# Patient Record
Sex: MALE | Race: WHITE | NOT HISPANIC OR LATINO | URBAN - METROPOLITAN AREA
[De-identification: names, ages, dates, MRNs, and addresses within clinical notes are randomized per-mention and may not be internally consistent; named-entity substitution may affect disease eponyms.]

---

## 2018-03-09 ENCOUNTER — OFFICE VISIT (OUTPATIENT)
Dept: FAMILY MEDICINE CLINIC | Facility: CLINIC | Age: 28
End: 2018-03-09

## 2018-03-09 VITALS
HEART RATE: 70 BPM | BODY MASS INDEX: 19.85 KG/M2 | RESPIRATION RATE: 16 BRPM | DIASTOLIC BLOOD PRESSURE: 64 MMHG | SYSTOLIC BLOOD PRESSURE: 112 MMHG | WEIGHT: 134 LBS | OXYGEN SATURATION: 99 % | TEMPERATURE: 98.3 F | HEIGHT: 69 IN

## 2018-03-09 DIAGNOSIS — Z00.00 ANNUAL PHYSICAL EXAM: Primary | ICD-10-CM

## 2018-03-09 LAB
ALBUMIN SERPL-MCNC: 4.7 G/DL (ref 3.2–4.8)
ALBUMIN/GLOB SERPL: 1.6 G/DL (ref 1.5–2.5)
ALP SERPL-CCNC: 57 U/L (ref 25–100)
ALT SERPL W P-5'-P-CCNC: 29 U/L (ref 7–40)
ANION GAP SERPL CALCULATED.3IONS-SCNC: 8 MMOL/L (ref 3–11)
ARTICHOKE IGE QN: 174 MG/DL (ref 0–130)
AST SERPL-CCNC: 31 U/L (ref 0–33)
BASOPHILS # BLD AUTO: 0.08 10*3/MM3 (ref 0–0.2)
BASOPHILS NFR BLD AUTO: 1.1 % (ref 0–1)
BILIRUB SERPL-MCNC: 0.8 MG/DL (ref 0.3–1.2)
BUN BLD-MCNC: 12 MG/DL (ref 9–23)
BUN/CREAT SERPL: 12 (ref 7–25)
CALCIUM SPEC-SCNC: 9.7 MG/DL (ref 8.7–10.4)
CHLORIDE SERPL-SCNC: 101 MMOL/L (ref 99–109)
CHOLEST SERPL-MCNC: 229 MG/DL (ref 0–200)
CO2 SERPL-SCNC: 30 MMOL/L (ref 20–31)
CREAT BLD-MCNC: 1 MG/DL (ref 0.6–1.3)
DEPRECATED RDW RBC AUTO: 40.3 FL (ref 37–54)
EOSINOPHIL # BLD AUTO: 0.36 10*3/MM3 (ref 0–0.3)
EOSINOPHIL NFR BLD AUTO: 5 % (ref 0–3)
ERYTHROCYTE [DISTWIDTH] IN BLOOD BY AUTOMATED COUNT: 12.6 % (ref 11.3–14.5)
GFR SERPL CREATININE-BSD FRML MDRD: 91 ML/MIN/1.73
GLOBULIN UR ELPH-MCNC: 2.9 GM/DL
GLUCOSE BLD-MCNC: 94 MG/DL (ref 70–100)
HCT VFR BLD AUTO: 47.9 % (ref 38.9–50.9)
HDLC SERPL-MCNC: 53 MG/DL (ref 40–60)
HGB BLD-MCNC: 16 G/DL (ref 13.1–17.5)
IMM GRANULOCYTES # BLD: 0.03 10*3/MM3 (ref 0–0.03)
IMM GRANULOCYTES NFR BLD: 0.4 % (ref 0–0.6)
LYMPHOCYTES # BLD AUTO: 2.68 10*3/MM3 (ref 0.6–4.8)
LYMPHOCYTES NFR BLD AUTO: 37 % (ref 24–44)
MCH RBC QN AUTO: 29.5 PG (ref 27–31)
MCHC RBC AUTO-ENTMCNC: 33.4 G/DL (ref 32–36)
MCV RBC AUTO: 88.2 FL (ref 80–99)
MONOCYTES # BLD AUTO: 0.51 10*3/MM3 (ref 0–1)
MONOCYTES NFR BLD AUTO: 7 % (ref 0–12)
NEUTROPHILS # BLD AUTO: 3.58 10*3/MM3 (ref 1.5–8.3)
NEUTROPHILS NFR BLD AUTO: 49.5 % (ref 41–71)
PLATELET # BLD AUTO: 273 10*3/MM3 (ref 150–450)
PMV BLD AUTO: 12 FL (ref 6–12)
POTASSIUM BLD-SCNC: 4.7 MMOL/L (ref 3.5–5.5)
PROT SERPL-MCNC: 7.6 G/DL (ref 5.7–8.2)
RBC # BLD AUTO: 5.43 10*6/MM3 (ref 4.2–5.76)
SODIUM BLD-SCNC: 139 MMOL/L (ref 132–146)
TRIGL SERPL-MCNC: 135 MG/DL (ref 0–150)
TSH SERPL DL<=0.05 MIU/L-ACNC: 3.46 MIU/ML (ref 0.35–5.35)
WBC NRBC COR # BLD: 7.24 10*3/MM3 (ref 3.5–10.8)

## 2018-03-09 PROCEDURE — 80061 LIPID PANEL: CPT | Performed by: FAMILY MEDICINE

## 2018-03-09 PROCEDURE — 84443 ASSAY THYROID STIM HORMONE: CPT | Performed by: FAMILY MEDICINE

## 2018-03-09 PROCEDURE — 36415 COLL VENOUS BLD VENIPUNCTURE: CPT | Performed by: FAMILY MEDICINE

## 2018-03-09 PROCEDURE — 80053 COMPREHEN METABOLIC PANEL: CPT | Performed by: FAMILY MEDICINE

## 2018-03-09 PROCEDURE — 99395 PREV VISIT EST AGE 18-39: CPT | Performed by: FAMILY MEDICINE

## 2018-03-09 PROCEDURE — 85025 COMPLETE CBC W/AUTO DIFF WBC: CPT | Performed by: FAMILY MEDICINE

## 2018-03-09 NOTE — PROGRESS NOTES
"Subjective   Jarrett Garza is a 27 y.o. male and is here for a comprehensive physical exam. The patient reports no problems.  Not exercising.       Are you taking aspirin daily? no        The following portions of the patient's history were reviewed and updated as appropriate: allergies, current medications, past family history, past medical history, past social history, past surgical history and problem list.    Review of Systems    Constitutional: negative  Eyes: negative for contacts/glasses  Ears, nose, mouth, throat, and face: negative  Respiratory: negative  Cardiovascular: negative  Gastrointestinal: negative  Genitourinary:negative  Integument/breast: remote cyst removal  Hematologic/lymphatic: negative  Musculoskeletal:negative  Neurological: negative  Behavioral/Psych: negative  Endocrine: negative  Allergic/Immunologic: negative    Objective   /64  Pulse 70  Temp 98.3 °F (36.8 °C)  Resp 16  Ht 175.3 cm (69\")  Wt 60.8 kg (134 lb)  SpO2 99%  BMI 19.79 kg/m2  General appearance: alert, appears stated age, cooperative and no distress  Head: Normocephalic, without obvious abnormality, atraumatic  Eyes: conjunctivae/corneas clear. PERRL, EOM's intact. Fundi benign.  Ears: normal TM's and external ear canals both ears  Nose: Nares normal. Septum midline. Mucosa normal. No drainage or sinus tenderness.  Throat: lips, mucosa, and tongue normal; teeth and gums normal  Neck: no adenopathy, no carotid bruit, no JVD, supple, symmetrical, trachea midline and thyroid not enlarged, symmetric, no tenderness/mass/nodules  Back: symmetric, no curvature. ROM normal. No CVA tenderness.  Lungs: clear to auscultation bilaterally  Chest wall: no tenderness  Heart: regular rate and rhythm, S1, S2 normal, no murmur, click, rub or gallop  Abdomen: soft, non-tender; bowel sounds normal; no masses,  no organomegaly  Extremities: extremities normal, atraumatic, no cyanosis or edema  Pulses: 2+ and symmetric  Skin: " Skin color, texture, turgor normal. No rashes or lesions  Lymph nodes: Cervical, supraclavicular, and axillary nodes normal.  Neurologic: Grossly normal     Assessment/Plan   Healthy male exam.        1. Patient Counseling:  --Nutrition: Stressed importance of moderation in sodium/caffeine intake, saturated fat and cholesterol, caloric balance, sufficient intake of fresh fruits, vegetables, fiber, calcium, iron, and 1 mg of folate supplement per day (for females capable of pregnancy).  --Discussed the issue of estrogen replacement, calcium supplement, and the daily use of baby aspirin.  --Exercise: Stressed the importance of regular exercise.   --Substance Abuse: Discussed cessation/primary prevention of tobacco, alcohol, or other drug use; driving or other dangerous activities under the influence; availability of treatment for abuse.    --Sexuality: Discussed sexually transmitted diseases, partner selection, use of condoms, avoidance of unintended pregnancy  and contraceptive alternatives.   --Injury prevention: Discussed safety belts, safety helmets, smoke detector, smoking near bedding or upholstery.   --Dental health: Discussed importance of regular tooth brushing, flossing, and dental visits.  --Immunizations reviewed.  --Discussed benefits of screening colonoscopy.  --After hours service discussed with patient    2. Discussed the patient's BMI with him.  The BMI is in the acceptable range  3. Follow up next physical in 2 years

## 2018-07-10 ENCOUNTER — OFFICE VISIT (OUTPATIENT)
Dept: FAMILY MEDICINE CLINIC | Facility: CLINIC | Age: 28
End: 2018-07-10

## 2018-07-10 VITALS
OXYGEN SATURATION: 97 % | TEMPERATURE: 98.9 F | WEIGHT: 154 LBS | HEART RATE: 61 BPM | BODY MASS INDEX: 22.74 KG/M2 | RESPIRATION RATE: 16 BRPM | SYSTOLIC BLOOD PRESSURE: 120 MMHG | DIASTOLIC BLOOD PRESSURE: 80 MMHG

## 2018-07-10 DIAGNOSIS — S36.69XA RECTAL TEAR: Primary | ICD-10-CM

## 2018-07-10 PROCEDURE — 99213 OFFICE O/P EST LOW 20 MIN: CPT | Performed by: FAMILY MEDICINE

## 2018-07-10 NOTE — PROGRESS NOTES
Subjective   Jarrett Garza is a 27 y.o. male.     History of Present Illness   Blood following bowel movement, rectal itching.  No abdominal pain. Some constipation, no diarrhea.   The following portions of the patient's history were reviewed and updated as appropriate: allergies, current medications, past family history, past medical history, past social history, past surgical history and problem list.    Review of Systems   Constitutional: Negative.    Respiratory: Negative.    Gastrointestinal: Positive for blood in stool. Negative for abdominal distention and anal bleeding.       Objective   Physical Exam   Pulmonary/Chest: Effort normal.   Genitourinary: Rectal exam shows fissure (small tear at three o'clock ). Rectal exam shows no mass, no tenderness and guaiac negative stool.   Neurological: He is alert.   Vitals reviewed.      Assessment/Plan   Jarrett was seen today for rectal bleeding.    Diagnoses and all orders for this visit:    Rectal tear (CMS/HCC)    Other orders  -     hydrocortisone (ANUSOL-HC) 2.5 % rectal cream; Insert  into the rectum 2 (Two) Times a Day.      Encourage Sitz bath bid with cream use.

## 2018-09-13 ENCOUNTER — OFFICE VISIT (OUTPATIENT)
Dept: FAMILY MEDICINE CLINIC | Facility: CLINIC | Age: 28
End: 2018-09-13

## 2018-09-13 VITALS
WEIGHT: 155 LBS | RESPIRATION RATE: 16 BRPM | HEIGHT: 69 IN | HEART RATE: 68 BPM | SYSTOLIC BLOOD PRESSURE: 118 MMHG | DIASTOLIC BLOOD PRESSURE: 82 MMHG | OXYGEN SATURATION: 98 % | BODY MASS INDEX: 22.96 KG/M2

## 2018-09-13 DIAGNOSIS — B35.6 JOCK ITCH: Primary | ICD-10-CM

## 2018-09-13 DIAGNOSIS — Z23 IMMUNIZATION DUE: ICD-10-CM

## 2018-09-13 PROCEDURE — 99213 OFFICE O/P EST LOW 20 MIN: CPT | Performed by: FAMILY MEDICINE

## 2018-09-13 PROCEDURE — 90471 IMMUNIZATION ADMIN: CPT | Performed by: FAMILY MEDICINE

## 2018-09-13 PROCEDURE — 90632 HEPA VACCINE ADULT IM: CPT | Performed by: FAMILY MEDICINE

## 2018-09-13 RX ORDER — KETOCONAZOLE 200 MG/1
200 TABLET ORAL DAILY
Qty: 10 TABLET | Refills: 0 | Status: SHIPPED | OUTPATIENT
Start: 2018-09-13 | End: 2020-04-29

## 2018-09-13 NOTE — PROGRESS NOTES
Subjective   Jarrett Garza is a 27 y.o. male.     History of Present Illness   Two weeks of groin itching, used topical cream that helped. No fever. No voiding trouble.  No rash.   Would like hepatitis A immunization  The following portions of the patient's history were reviewed and updated as appropriate: allergies, current medications, past family history, past medical history, past social history, past surgical history and problem list.    Review of Systems   Constitutional: Negative.    Respiratory: Negative.    Musculoskeletal: Negative.    Skin: Positive for rash.       Objective   Physical Exam   Constitutional: He appears well-developed.   Pulmonary/Chest: Effort normal.   Neurological: He is alert.   Skin: Rash (sacttered on scrotum and base of penis.  No groin fold rash and no nodes.) noted.   Vitals reviewed.      Assessment/Plan   Jarrett was seen today for itching.    Diagnoses and all orders for this visit:    Jock itch  -     ketoconazole (NIZORAL) 200 MG tablet; Take 1 tablet by mouth Daily.    Immunization due  -     Hepatitis A Vaccine Adult IM

## 2018-11-16 ENCOUNTER — FLU SHOT (OUTPATIENT)
Dept: FAMILY MEDICINE CLINIC | Facility: CLINIC | Age: 28
End: 2018-11-16

## 2018-11-16 DIAGNOSIS — Z23 NEED FOR INFLUENZA VACCINATION: ICD-10-CM

## 2018-11-16 PROCEDURE — 90686 IIV4 VACC NO PRSV 0.5 ML IM: CPT | Performed by: FAMILY MEDICINE

## 2018-11-16 PROCEDURE — 90471 IMMUNIZATION ADMIN: CPT | Performed by: FAMILY MEDICINE

## 2020-04-29 ENCOUNTER — OFFICE VISIT (OUTPATIENT)
Dept: FAMILY MEDICINE CLINIC | Facility: CLINIC | Age: 30
End: 2020-04-29

## 2020-04-29 VITALS
OXYGEN SATURATION: 97 % | HEIGHT: 69 IN | WEIGHT: 159.4 LBS | DIASTOLIC BLOOD PRESSURE: 86 MMHG | HEART RATE: 78 BPM | BODY MASS INDEX: 23.61 KG/M2 | RESPIRATION RATE: 16 BRPM | SYSTOLIC BLOOD PRESSURE: 124 MMHG | TEMPERATURE: 98.2 F

## 2020-04-29 DIAGNOSIS — Z76.89 RETURN TO WORK EVALUATION: Primary | ICD-10-CM

## 2020-04-29 PROBLEM — K21.9 GASTROESOPHAGEAL REFLUX DISEASE: Status: RESOLVED | Noted: 2020-04-29 | Resolved: 2020-04-29

## 2020-04-29 PROBLEM — K21.9 GASTROESOPHAGEAL REFLUX DISEASE: Status: ACTIVE | Noted: 2020-04-29

## 2020-04-29 PROBLEM — N60.09 CYST OF BREAST: Status: RESOLVED | Noted: 2020-04-29 | Resolved: 2020-04-29

## 2020-04-29 PROBLEM — N60.09 CYST OF BREAST: Status: ACTIVE | Noted: 2020-04-29

## 2020-04-29 PROCEDURE — 99212 OFFICE O/P EST SF 10 MIN: CPT | Performed by: FAMILY MEDICINE

## 2020-04-29 NOTE — ASSESSMENT & PLAN NOTE
Patient had no direct exposure with COVID-19 patient.  His mother whom he lives with was a nurse taking care of a COVID-19 patient.  They both quarantined for over 14 days with no symptoms present.  Patient was given return to work letter.  RTC precautions given.

## 2020-04-29 NOTE — PROGRESS NOTES
Jarrett Garza is a 29 y.o. male who presents today to establish care.    Chief Complaint   Patient presents with   • Establish Care     Needs release to work        Patient is here to establish care and get a note to return to work after self quarantining. He does not take any medications for chronic medical conditions. He self quarantined due to his mother being exposed to a COVID-19 patient as a nurse on April 4. They both self quarantined starting April 4th and no one in the house hold showed symptoms of COVID-19.        Review of Systems   Constitutional: Negative for chills, fatigue, fever and unexpected weight loss.   HENT: Negative for congestion, ear pain and sore throat.    Eyes: Negative for visual disturbance.   Respiratory: Negative for cough, shortness of breath and wheezing.    Cardiovascular: Negative for chest pain and palpitations.   Gastrointestinal: Negative for abdominal pain, blood in stool, constipation, diarrhea, nausea, vomiting and GERD.   Endocrine: Negative for polydipsia and polyuria.   Genitourinary: Negative for difficulty urinating and frequency.   Musculoskeletal: Negative for joint swelling.   Skin: Negative for rash and skin lesions.   Allergic/Immunologic: Negative for environmental allergies.   Neurological: Negative for dizziness, seizures, syncope and headache.   Hematological: Does not bruise/bleed easily.   Psychiatric/Behavioral: Negative for suicidal ideas.        PHQ-9 Depression Screening  Little interest or pleasure in doing things? 0   Feeling down, depressed, or hopeless? 0   Trouble falling or staying asleep, or sleeping too much?     Feeling tired or having little energy?     Poor appetite or overeating?     Feeling bad about yourself - or that you are a failure or have let yourself or your family down?     Trouble concentrating on things, such as reading the newspaper or watching television?     Moving or speaking so slowly that other people could have noticed?  Or the opposite - being so fidgety or restless that you have been moving around a lot more than usual?     Thoughts that you would be better off dead, or of hurting yourself in some way?     PHQ-9 Total Score 0   If you checked off any problems, how difficult have these problems made it for you to do your work, take care of things at home, or get along with other people?         History reviewed. No pertinent past medical history.     Past Surgical History:   Procedure Laterality Date   • BREAST SURGERY  2014    cysts removal        Family History   Problem Relation Age of Onset   • Colon cancer Paternal Grandfather    • No Known Problems Mother    • No Known Problems Father    • No Known Problems Sister    • Lung cancer Maternal Grandmother    • Heart disease Maternal Grandfather    • No Known Problems Paternal Grandmother         Social History     Socioeconomic History   • Marital status: Single     Spouse name: Not on file   • Number of children: Not on file   • Years of education: Not on file   • Highest education level: Not on file   Tobacco Use   • Smoking status: Former Smoker     Types: Cigarettes     Last attempt to quit: 3/29/2020     Years since quittin.0   • Smokeless tobacco: Never Used   Substance and Sexual Activity   • Alcohol use: Yes     Frequency: 2-3 times a week     Drinks per session: 1 or 2     Comment: socially   • Drug use: No   • Sexual activity: Not Currently     Comment: 0 partners in the last year        Current Outpatient Medications on File Prior to Visit   Medication Sig Dispense Refill   • [DISCONTINUED] ketoconazole (NIZORAL) 200 MG tablet Take 1 tablet by mouth Daily. 10 tablet 0     No current facility-administered medications on file prior to visit.        Allergies   Allergen Reactions   • Amoxicillin Rash     As infant        Visit Vitals  /86 (BP Location: Right arm, Patient Position: Sitting, Cuff Size: Adult)   Pulse 78   Temp 98.2 °F (36.8 °C) (Temporal)   Resp  "16   Ht 175.3 cm (69\")   Wt 72.3 kg (159 lb 6.4 oz)   SpO2 97%   BMI 23.54 kg/m²      Body mass index is 23.54 kg/m².    Physical Exam   Constitutional: He is oriented to person, place, and time. He appears well-developed and well-nourished. No distress.   HENT:   Head: Atraumatic.   Eyes: EOM are normal.   Neck: Normal range of motion. Neck supple.   Cardiovascular: Normal rate, regular rhythm, normal heart sounds and intact distal pulses. Exam reveals no gallop and no friction rub.   No murmur heard.  Pulmonary/Chest: Effort normal and breath sounds normal. No respiratory distress. He has no wheezes. He has no rales.   Abdominal: Soft. Bowel sounds are normal. He exhibits no distension. There is no tenderness.   Musculoskeletal: He exhibits no edema.   Neurological: He is alert and oriented to person, place, and time.   Skin: Skin is warm and dry. He is not diaphoretic.   Psychiatric: He has a normal mood and affect. His behavior is normal.             Problems Addressed this Visit        Other    Return to work evaluation - Primary     Patient had no direct exposure with COVID-19 patient.  His mother whom he lives with was a nurse taking care of a COVID-19 patient.  They both quarantined for over 14 days with no symptoms present.  Patient was given return to work letter.  RTC precautions given.               Return in about 1 month (around 5/29/2020) for Annual.    Parts of this office note have been dictated by voice recognition software. Grammatical and/or spelling errors may be present.     Laith Pablo MD  4/29/2020      "

## 2020-08-02 PROCEDURE — U0003 INFECTIOUS AGENT DETECTION BY NUCLEIC ACID (DNA OR RNA); SEVERE ACUTE RESPIRATORY SYNDROME CORONAVIRUS 2 (SARS-COV-2) (CORONAVIRUS DISEASE [COVID-19]), AMPLIFIED PROBE TECHNIQUE, MAKING USE OF HIGH THROUGHPUT TECHNOLOGIES AS DESCRIBED BY CMS-2020-01-R: HCPCS | Performed by: NURSE PRACTITIONER

## 2020-08-05 ENCOUNTER — TELEPHONE (OUTPATIENT)
Dept: URGENT CARE | Facility: CLINIC | Age: 30
End: 2020-08-05

## 2022-01-11 PROCEDURE — U0004 COV-19 TEST NON-CDC HGH THRU: HCPCS | Performed by: NURSE PRACTITIONER
